# Patient Record
Sex: MALE | Race: WHITE | NOT HISPANIC OR LATINO | Employment: STUDENT | URBAN - METROPOLITAN AREA
[De-identification: names, ages, dates, MRNs, and addresses within clinical notes are randomized per-mention and may not be internally consistent; named-entity substitution may affect disease eponyms.]

---
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
## 2023-01-17 ENCOUNTER — TELEPHONE (OUTPATIENT)
Dept: CONSULT | Facility: CLINIC | Age: 18
End: 2023-01-17
Payer: COMMERCIAL

## 2023-01-17 NOTE — TELEPHONE ENCOUNTER
[facility] Call Center    Phone Message    May a detailed message be left on voicemail: yes     Reason for Call: Other: mom is wondering if we got the results from [facility] at all or if Toy should just make an appointment to see you? Sibling of Leonardo Garcia MRN [id number]    Action Taken: Other: peds genetics [place]    Travel Screening: Not Applicable

## 2023-02-01 NOTE — TELEPHONE ENCOUNTER
LVM for patient (consent to communicate not on file for mom) informing him that [facility] records are available and if he would like to set up new patient appointment with Dr. Mosley that is OK.    If patient call back to schedule, OK to schedule new patient Genetics appointment with Dr. Jane Mosley, with GC visit 30 minutes prior.

## 2024-11-07 ENCOUNTER — VIRTUAL VISIT (OUTPATIENT)
Dept: CONSULT | Facility: CLINIC | Age: 19
End: 2024-11-07
Payer: COMMERCIAL

## 2024-11-07 DIAGNOSIS — Z13.71 ENCOUNTER FOR NONPROCREATIVE GENETIC COUNSELING AND TESTING: ICD-10-CM

## 2024-11-07 DIAGNOSIS — Z71.83 ENCOUNTER FOR NONPROCREATIVE GENETIC COUNSELING AND TESTING: ICD-10-CM

## 2024-11-07 DIAGNOSIS — Z82.79 FAMILY HISTORY OF CHROMOSOMAL MICRODELETION: Primary | ICD-10-CM

## 2024-11-07 PROCEDURE — 999N000069 HC STATISTIC GENETIC COUNSELING, < 16 MIN: Mod: GT,95

## 2024-11-07 NOTE — LETTER
"2024      RE: Toy Garcia  [address]  [place] [address]     Dear Colleague,    Thank you for the opportunity to participate in the care of your patient, Toy Garcai, at the [facility] at [facility]. Please see a copy of my visit note below.    Virtual Visit Details    Type of service:  Video Visit   Originating Location (pt. Location): Home  Distant Location (provider location):  On-site  Platform used for Video Visit: Contorion      GENETIC COUNSELING CONSULTATION     Name:  Toy Garcia \"Toy\"  :   2005  MRN:   [id number]  Date of service: 2024  Primary Provider: Rosa Isela Mistry  Referring Provider: No ref. provider found    Presenting Information:  Toy Garcia is a 19 year old male presenting to genetic counseling via video visit due to a family history of 16p11.2 Deletion Syndrome. he was h. I met with Toy to obtain a 3 generation family history, discuss genetic testing options and obtain informed consent.     HPI:  Toy's sister, Leonardo (Soumya) was diagnosed with 16p11.2 microdeletion in . Their mother was tested for this and was not found to carry it. Their father (Guilherme) has not been tested. Guilherme (who is co-guardian of Soumya) gave me permission to open her chart to obtain her genetic testing information and family history information for Toy's appointment. Toy himself has a diagnosis of autism spectrum disorder.     Previous Genetic Testing  2018 - Fragile X Analysis; Negative/normal. Hemizygous for 30 repeats.  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 We will connect with the cytogenomic lab to determine if this test was sensitive enough to detect the familial 16p11.2 " microdeletion.    Family History:   A three generation pedigree was last updated at Guilherme's daughter's genetics visit on 9/19/22 by patient report and scanned into the EMR. 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   Due to the small size of the 16p11.2 deletion, it was not detectable by   G-banding.  None of the five metaphase cells analyzed by G-banding   showed any numerical or structural chromosomal abnormality.  Toy's paternal uncle's daughter has a child with autism     Discussion:     We discussed the option of genetic testing in light of Toy's family history of 16p11.2 Microdeletion Syndrome in his sister, Soumya.     16p11.2 Microdeletion Syndrome  This condition is known to cause neurodevelopmental differences, such as autism, developmental delays and/or intellectual disability. Obesity is also frequently a component of this condition. We discussed that other features of this syndrome may include seizures, differences in the spinal bones such as scoliosis, hearing loss, and congenital anomalies (heart/renal/brain structural differences). There is great variability in 16p11.2 Deletion Syndrome both in which features a person has and their severity/impact on that person's life.     This deletion is usually something that is brand new in a person and does not come from their parent(s), however, there are reports of this being inherited from more mildly affected parents.     This condition is autosomal dominant, meaning that there would be a 50% or 1 in 2 chance of passing the  "deletion down with each pregnancy of an affected person. There are many different reproductive options ranging from preconception testing, testing during a pregnancy or  testing. With each of these options there are medical, financial, ethical, and emotional considerations that every family must weight for themselves.     It is medically necessary to determine if Toy has the 16p11.2 Microdeletion:  Positive results of this genetic test could provide important information related to Toy's health and may have implications for his medical management. These may include but are not limited to changes in recommended screening, imaging and/or appointments with different types of medical providers.   These test results may also provide information that will allow Toy's current doctors to treat him more effectively.   This information additionally has implications for Toy's future children.    There is a federal law in place at the moment, The Genetic Information Nondiscrimination Act or ARBEN (2008) that protects against health insurance discrimination on the basis of genetic information for employement and health insurance. Howevert this only applies to companies with 15 or more employees. It does not apply to federal employees, or , which have their own nondiscrimination protections in place. Employers may have \"voluntary\" health services such as employee wellness programs that request genetic information or family history, which is not a violation of ARBEN. Health insurance protections do not apply to members of the [place]  who receive care through [employer], Veterans receiving care through the VA, the [facility], or federal employees who receive care through Federal Employees Health Benefits Plan. We discussed that there are insurance implications related to these findings as ARBEN's protections do not apply to life, disability, and long term care insurance. Additional information can be " found at https://www.ashg.org/advocacy/[place]/.    Our team will submit a prior authorization on Toy's behalf. A determination about this will be made in about 2-4 weeks. At this time, Toy will be contacted with the determination and will be able to decide if theyw ould like to proceed with the test. If so, a Docusign consent form will be emailed to Toy and a blood draw will be scheduled. Testing will then be initiated and will take approximately 3-4 weeks.      I will call when we have results and we will schedule a follow-up visit if needed at that time. Toy is encouraged to reach out to me with any questions in the meantime.      Plan  We will connect with the cytogenomic lab to determine if Toy's prior testing through the [facility] (performed at [facility]) was sensitive enough to detect the familial 16p11.2 microdeletion.  If prior testing was not sufficient, a prior authorization will be submitted for Toy's genetic testing at [facility] at the [facility]. Toy will be notified of the determination and will schedule a blood draw.   After testing is initiated, results will be returned by phone in 3-4 weeks and we will schedule a follow-up appointment as needed  Contact information was provided should any questions arise in the future.     Please do not hesitate to reach out with any questions or concerns. It was a pleasure to participate in Toy's care today.       Patricia Caicedo MS, [facility]  Genetic Counseling  [facility]  Pager: [phone]  Phone: [phone]  Fax: [phone]     Approximate Time Spent in Consultation: 15 min      Please do not hesitate to contact me if you have any questions/concerns.     Sincerely,       Patricia Caicedo, GC

## 2024-11-07 NOTE — PROGRESS NOTES
"Virtual Visit Details    Type of service:  Video Visit   Originating Location (pt. Location): Home  Distant Location (provider location):  On-site  Platform used for Video Visit: AnyCloud      GENETIC COUNSELING CONSULTATION     Name:  Toy Garcia \"Toy\"  :   2005  MRN:   [id number]  Date of service: 2024  Primary Provider: Rosa Isela Mistry  Referring Provider: No ref. provider found    Presenting Information:  Toy Garcia is a 19 year old male presenting to genetic counseling via video visit due to a family history of 16p11.2 Deletion Syndrome. he was h. I met with Toy to obtain a 3 generation family history, discuss genetic testing options and obtain informed consent.     HPI:  Toy's sister, Leonardo (Soumya) was diagnosed with 16p11.2 microdeletion in . Their mother was tested for this and was not found to carry it. Their father (Guilherme) has not been tested. Guilherme (who is co-guardian of Soumya) gave me permission to open her chart to obtain her genetic testing information and family history information for Toy's appointment. Toy himself has a diagnosis of autism spectrum disorder.     Previous Genetic Testing  2018 - Fragile X Analysis; Negative/normal. Hemizygous for 30 repeats.  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 We will connect with the cytogenomic lab to determine if this test was sensitive enough to detect the familial 16p11.2 microdeletion.    Family History:   A three generation pedigree was last updated at Guilherme's daughter's genetics visit on 22 by patient report and scanned into the EMR. 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   Due to the small size of the 16p11.2 deletion, it was not detectable by   G-banding.  None of the five metaphase cells analyzed by G-banding   showed any numerical or structural chromosomal abnormality.  Toy's paternal uncle's daughter has a child with autism     Discussion:     We discussed the option of genetic testing in light of Toy's family history of 16p11.2 Microdeletion Syndrome in his sister, Soumya.     16p11.2 Microdeletion Syndrome  This condition is known to cause neurodevelopmental differences, such as autism, developmental delays and/or intellectual disability. Obesity is also frequently a component of this condition. We discussed that other features of this syndrome may include seizures, differences in the spinal bones such as scoliosis, hearing loss, and congenital anomalies (heart/renal/brain structural differences). There is great variability in 16p11.2 Deletion Syndrome both in which features a person has and their severity/impact on that person's life.     This deletion is usually something that is brand new in a person and does not come from their parent(s), however, there are reports of this being inherited from more mildly affected parents.     This condition is autosomal dominant, meaning that there would be a 50% or 1 in 2 chance of passing the deletion down with each pregnancy of an affected person. There are many different reproductive options ranging from preconception testing, testing during a pregnancy or  testing. With each of these options there are medical, financial, ethical, and emotional considerations that every family must weight for themselves.     It is medically necessary  "to determine if Toy has the 16p11.2 Microdeletion:  Positive results of this genetic test could provide important information related to Toy's health and may have implications for his medical management. These may include but are not limited to changes in recommended screening, imaging and/or appointments with different types of medical providers.   These test results may also provide information that will allow Toy's current doctors to treat him more effectively.   This information additionally has implications for Toy's future children.    There is a federal law in place at the moment, The Genetic Information Nondiscrimination Act or ARBEN (2008) that protects against health insurance discrimination on the basis of genetic information for employement and health insurance. Howevert this only applies to companies with 15 or more employees. It does not apply to federal employees, or , which have their own nondiscrimination protections in place. Employers may have \"voluntary\" health services such as employee wellness programs that request genetic information or family history, which is not a violation of ARBEN. Health insurance protections do not apply to members of the [place]  who receive care through [employer], Veterans receiving care through the VA, the [facility], or federal employees who receive care through Federal Employees Health Benefits Plan. We discussed that there are insurance implications related to these findings as ARBEN's protections do not apply to life, disability, and long term care insurance. Additional information can be found at https://www.ashg.org/advocacy/arben/.    Our team will submit a prior authorization on Toy's behalf. A determination about this will be made in about 2-4 weeks. At this time, Toy will be contacted with the determination and will be able to decide if theyw ould like to proceed with the test. If so, a Docusign consent form will be emailed to Toy and " a blood draw will be scheduled. Testing will then be initiated and will take approximately 3-4 weeks.      I will call when we have results and we will schedule a follow-up visit if needed at that time. Toy is encouraged to reach out to me with any questions in the meantime.      Plan  We will connect with the cytogenomic lab to determine if Toy's prior testing through the [facility] (performed at [facility]) was sensitive enough to detect the familial 16p11.2 microdeletion.  If prior testing was not sufficient, a prior authorization will be submitted for Toy's genetic testing at The [facility] Lab at the [facility]. Toy will be notified of the determination and will schedule a blood draw.   After testing is initiated, results will be returned by phone in 3-4 weeks and we will schedule a follow-up appointment as needed  Contact information was provided should any questions arise in the future.     Please do not hesitate to reach out with any questions or concerns. It was a pleasure to participate in Toy's care today.       Patricia Caicedo MS, [facility]  Genetic Counseling  [facility]  Pager: [phone]  Phone: [phone]  Fax: [phone]     Approximate Time Spent in Consultation: 15 min

## 2024-11-07 NOTE — NURSING NOTE
How would you like to obtain your AVS? Mail a copy  If the video visit is dropped, the invitation should be resent by: Text to cell phone: [phone]  Will anyone else be joining your video visit? Klarissa Swanson, Clinic Assistant

## 2024-11-08 NOTE — PATIENT INSTRUCTIONS
Plan  We will connect with the cytogenomic lab to determine if Toy's prior testing through the [facility] (performed at [facility]) was sensitive enough to detect the familial 16p11.2 microdeletion.  If prior testing was not sufficient, a prior authorization will be submitted for Toy's genetic testing at The [facility] Lab at the [facility]. Toy will be notified of the determination and will schedule a blood draw.   After testing is initiated, results will be returned by phone in 3-4 weeks and we will schedule a follow-up appointment as needed  Contact information was provided should any questions arise in the future.     Please do not hesitate to reach out with any questions or concerns. It was a pleasure to participate in Toy's care today.       Patricia Caicedo MS, [facility]  Genetic Counseling  [facility]  Email: [email or web address]  Phone: [phone]  Fax: [phone]

## 2024-11-12 ENCOUNTER — TELEPHONE (OUTPATIENT)
Dept: CONSULT | Facility: CLINIC | Age: 19
End: 2024-11-12
Payer: COMMERCIAL

## 2024-11-12 NOTE — LETTER
11/12/2024    RE: Toy Garcia  [address]  [place] [address]     Dear Toy,    Thank you for the opportunity to participate in your care at [facility]. Please let the following serve as a summary of our conversation regarding your genetic testing results. A copy of those results is also enclosed.    I shared with Toy that he has actually already had genetic testing for the microdeletion found in his sister (Soumya) and that it was NOT detected in Toy. This means Toy is not at an increased risk of having a child with this microdeletion syndrome.    7/24/2018 - NORMAL MICROARRAY RESULT  ISCN: arr(1-22)x2,(XY)x1 (hg19) (normal male result)  The cytogenomic microarray analysis indicated no clinically  significant abnormalities and is consistent with a male  chromosome complement.    7/24/2018 - Fragile X (FMR1) with Reflex to Methylation Analysis  Fragile X Allele 1  CGG repeats 30  Negative: This individual has an allele in the normal range; therefore, he is neither affected with nor a carrier of Fragile X. This test does not detect rare mutations in less than 1% of Fragile X cases.    Toy questioned whether this testing might need to be repeated since it was done 6 years ago and I advised that I checked the technical specifications of the test and consulted with a cytogenetic colleague to confirm that the test Toy had was sufficient to assess him for the microdeletion in his sister.    Toy is encouraged to connect with a genetic counselor for preconception genetic counseling when he is ready to start a family so they can provide current, comprehensive counseling regarding genetic conditions. I am happy to place that referral, if needed.    Patricia Caicedo MS, [facility]  Genetic Counseling, [facility]  Email: [email or web address], Phone: [phone]

## 2024-11-12 NOTE — TELEPHONE ENCOUNTER
11/12/2024    I spoke with Toy to review some additional medical records I was [facility] to [facility] after our genetic counseling visit last week.    I shared with Toy that he has actually already had genetic testing for the microdeletion found in his sister (Soumya) and that it was NOT detected in Toy. This means Toy is not at an increased risk of having a child with this microdeletion syndrome.    7/24/2018 - NORMAL MICROARRAY RESULT  ISCN: arr(1-22)x2,(XY)x1 (hg19) (normal male result)  The cytogenomic microarray analysis indicated no clinically  significant abnormalities and is consistent with a male  chromosome complement.    7/24/2018 - Fragile X (FMR1) with Reflex to Methylation Analysis  Fragile X Allele 1  CGG repeats 30  Negative: This individual has an allele in the normal range; therefore, he is neither affected with nor a carrier of Fragile X. This test does not detect rare mutations in less than 1% of Fragile X cases.    Toy questioned whether this testing might need to be repeated since it was done 6 years ago and I advised that I checked the technical specifications of the test and consulted with a cytogenetic colleague to confirm that the test Toy had was sufficient to assess him for the microdeletion in his sister.    Toy was appreciative of the information and asked that I call his mother and father to share this information with them as well.     I will also mail copies of Toy's prior genetic testing to him for his records.     Toy is encouraged to connect with a genetic counselor for preconception genetic counseling when he is ready to start a family so they can provide current, comprehensive counseling regarding genetic conditions. I am happy to place that referral, if needed.    Toy is encouraged to reach out with any future questions or concerns.    Patricia Caicedo MS, [facility]  Genetic Counseling  [facility]  Email:  [email or web address]  Phone: [phone]  Fax: [phone]